# Patient Record
Sex: FEMALE | Race: WHITE | NOT HISPANIC OR LATINO | Employment: FULL TIME | URBAN - METROPOLITAN AREA
[De-identification: names, ages, dates, MRNs, and addresses within clinical notes are randomized per-mention and may not be internally consistent; named-entity substitution may affect disease eponyms.]

---

## 2018-05-24 ENCOUNTER — OFFICE VISIT (OUTPATIENT)
Dept: INTERNAL MEDICINE CLINIC | Facility: CLINIC | Age: 38
End: 2018-05-24
Payer: COMMERCIAL

## 2018-05-24 VITALS
HEIGHT: 66 IN | DIASTOLIC BLOOD PRESSURE: 90 MMHG | HEART RATE: 108 BPM | WEIGHT: 293 LBS | OXYGEN SATURATION: 100 % | BODY MASS INDEX: 47.09 KG/M2 | SYSTOLIC BLOOD PRESSURE: 132 MMHG

## 2018-05-24 DIAGNOSIS — F17.210 CIGARETTE NICOTINE DEPENDENCE WITHOUT COMPLICATION: ICD-10-CM

## 2018-05-24 DIAGNOSIS — F41.1 GENERALIZED ANXIETY DISORDER: Primary | ICD-10-CM

## 2018-05-24 DIAGNOSIS — R59.1 LYMPHADENOPATHY: ICD-10-CM

## 2018-05-24 DIAGNOSIS — E66.01 MORBID OBESITY (HCC): ICD-10-CM

## 2018-05-24 DIAGNOSIS — Z13.6 SCREENING FOR CARDIOVASCULAR CONDITION: ICD-10-CM

## 2018-05-24 DIAGNOSIS — R00.0 TACHYCARDIA: ICD-10-CM

## 2018-05-24 DIAGNOSIS — F33.41 RECURRENT MAJOR DEPRESSIVE DISORDER, IN PARTIAL REMISSION (HCC): ICD-10-CM

## 2018-05-24 PROCEDURE — 99204 OFFICE O/P NEW MOD 45 MIN: CPT | Performed by: INTERNAL MEDICINE

## 2018-05-24 PROCEDURE — 4004F PT TOBACCO SCREEN RCVD TLK: CPT | Performed by: INTERNAL MEDICINE

## 2018-05-24 PROCEDURE — 3008F BODY MASS INDEX DOCD: CPT | Performed by: INTERNAL MEDICINE

## 2018-05-24 RX ORDER — ESCITALOPRAM OXALATE 10 MG/1
10 TABLET ORAL DAILY
Qty: 90 TABLET | Refills: 1 | Status: SHIPPED | OUTPATIENT
Start: 2018-05-24

## 2018-05-24 NOTE — PROGRESS NOTES
INTERNAL MEDICINE OFFICE VISIT  [facility]  [address], [place], [address] [place] [address]  Tel: [phone]      NAME: Tamiko Herzog  AGE: 43 y o  SEX: female  : 1980   MRN: [id number]    DATE: 2018  TIME: 4:43 PM      Assessment and Plan:  1  Generalized anxiety disorder  Patient has been having a lot of anxiety all her life but did not want to take medication  She has been going for therapy off and on which does help her but she is not consistent with it   - CBC and differential; Future  - Comprehensive metabolic panel; Future  - TSH, 3rd generation; Future  - escitalopram (LEXAPRO) 10 mg tablet; Take 1 tablet (10 mg total) by mouth daily  Dispense: 90 tablet; Refill: 1    2  Recurrent major depressive disorder, in partial remission ([facility] )  She has no motivation and so was fatigue  I will start her on Lexapro 10 milligrams which will also help her anxiety along with the depression  I will see her back in 2 months for compliance  3  Morbid obesity ([facility] )  Patient does not want to see weight management as she is not interested in surgery  She was told to cut down on the calories, carbohydrates and fat intake and to increase her activity as much as possible in order to lose weight  4  Cigarette nicotine dependence without complication  She is a current smoker and says that she has cut down a lot  She was encouraged to continue to do so  5  Tachycardia  Her heart rate is high and she says when she is anxious her heart rate goes up    6  Lymphadenopathy  She has tender lymph node in front of the left ear  She has a couple of pimples that she has picked and they are open wounds on the face on the left side  I told her to let me know if the size of the lymph node increases ordered gets more painful  7  Screening for cardiovascular condition    - Lipid panel;  Future      - Counseling Documentation: patient was counseled regarding: instructions for management, risk factor reductions, prognosis, patient and family education, impressions, risks and benefits of treatment options and importance of compliance with treatment  - Medication Side Effects: Adverse side effects of medications were reviewed with the patient/guardian today  Return for follow up visit in 2 months or earlier, if needed  Chief Complaint:  Chief Complaint   Patient presents with    [facility]         History of Present Illness: This is a new patient was here to establish  Anxiety and depression-patient has been suffering from anxiety which is severe in degree and also from a lot of depression for a very long time  She had never taken any medication for it  She has gone for therapy off and on  Morbid obesity-she has been heavy all her life and has not been able to lose weight  Current smoker-she has been smoking for about 20 years off and on  She claims she has decreased the number of cigarettes  Tachycardia-will follow up at the next visit  Lymphadenopathy-she has a tender lymph node in front of the left ear which coincides with pimples on the same side of the face  Active Problem List:  Patient Active Problem List   Diagnosis    Morbid obesity ([facility] )    Tachycardia    Recurrent major depressive disorder, in partial remission [facility]    Generalized anxiety disorder    Cigarette nicotine dependence without complication         Past Medical History:  History reviewed  No pertinent past medical history  Past Surgical History:  History reviewed  No pertinent surgical history        Family History:  Family History   Problem Relation Age of Onset    Diabetes Father     Hyperlipidemia Father          Social History:  Social History     Social History    Marital status:      Spouse name: N/A    Number of children: N/A    Years of education: N/A     Social History Main Topics    Smoking status: Current Every Day Smoker     Packs/day: 0 25     Years: 20 00    Smokeless tobacco: Never Used    Alcohol use Yes      Comment: Occasional    Drug use: No    Sexual activity: Yes     Other Topics Concern    None     Social History Narrative    None         Allergies:   Allergies   Allergen Reactions    Penicillins Rash         Medications:    Current Outpatient Prescriptions:     levonorgestrel (MIRENA) 20 MCG/24HR IUD, 1 each by Intrauterine route, Disp: , Rfl:     escitalopram (LEXAPRO) 10 mg tablet, Take 1 tablet (10 mg total) by mouth daily, Disp: 90 tablet, Rfl: 1      The following portions of the patient's history were reviewed and updated as appropriate: past medical history, past surgical history, family history, social history, allergies, current medications and active problem list       Review of Systems:  Constitutional: Denies fever, chills, weight gain, weight loss, fatigue  Eyes: Denies eye redness, eye discharge, double vision, change in visual acuity  ENT: Denies hearing loss, tinnitus, sneezing, nasal congestion, nasal discharge, sore throat   Respiratory: Denies cough, expectoration, hemoptysis, shortness of breath, wheezing  Cardiovascular: Denies chest pain, palpitations, lower extremity swelling, orthopnea, PND  Gastrointestinal: Denies abdominal pain, heartburn, nausea, vomiting, hematemesis, diarrhea, bloody stools  Genito-Urinary: Denies dysuria, frequency, difficulty in micturition, nocturia, incontinence  Musculoskeletal: Denies back pain, joint pain, muscle pain  Neurologic: Denies confusion, lightheadedness, syncope, headache, focal weakness, sensory changes, seizures  Endocrine: Denies polyuria, polydipsia, temperature intolerance  Allergy and Immunology: Denies hives, insect bite sensitivity  Hematological and Lymphatic: Denies bleeding problems, swollen glands   Psychological: has depression,, anxiety, panic, mood swings  Dermatological: Denies pruritus, rash, skin lesion changes      Vitals:  Vitals:    05/24/18 1615   BP: 132/90 Pulse: (!) 108   SpO2: 100%       Body mass index is 58 69 kg/m²  Weight (last 2 days)     Date/Time   Weight    05/24/18 1615  (!)  165 (363 6)                Physical Examination:  General: Patient is not in acute distress  Awake, alert, responding to commands  No weight gain or loss  Head: Normocephalic  Atraumatic  Eyes: Conjunctiva and lids with no swelling, erythema or discharge  Both pupils normal sized, round and reactive to light  Sclera nonicteric  ENT: External examination of nose and ear normal  Otoscopic examination shows translucent tympanic membranes with patent canals without erythema  Oropharynx moist with no erythema, edema, exudate or lesions  Neck: Supple  JVP not raised  Trachea midline  No masses  No thyromegaly  Lungs: No signs of increased work of breathing or respiratory distress  Bilateral bronchovascular breath sounds with no crackles or rhonchi  Chest wall: No tenderness  Cardiovascular: Normal PMI  No thrills  Regular and tachycardic S1 and S2 normal  No murmur, rub or gallop  Gastrointestinal: Abdomen soft, nontender  No guarding or rigidity  Liver and spleen not palpable  Bowel sounds present  Neurologic: Cranial nerves II-XII intact   Cortical functions normal  Motor system - Reflexes 2+ and symmetrical  Sensations normal  Musculoskeletal: Gait normal  No joint tenderness  Integumentary: Skin normal with no rash or lesions  Lymphatic: No palpable lymph nodes in neck, axilla or groin  Extremities: No clubbing, cyanosis, edema or varicosities  Psychological: Judgement and insight normal   Very anxious and depressed      Laboratory Results:  CBC with diff:   No results found for: WBC, RBC, HGB, HCT, MCV, MCH, RDW, PLT    CMP:  No results found for: CREATININE, BUN, NA, K, CL, CO2, GLUCOSE, PROT, ALKPHOS, ALT, AST, BILIDIR    No results found for: HGBA1C, MG, PHOS    No results found for: TROPONINI, CKMB, CKTOTAL    Lipid Profile:   No results found for: CHOL  No results found for: HDL  No results found for: LDLCALC  No results found for: TRIG      Health Maintenance:  Health Maintenance   Topic Date Due    HIV SCREENING  1980    PNEUMOCOCCAL POLYSACCHARIDE VACCINE AGE 2-64 HIGH RISK  04/03/1981    DTaP,Tdap,and Td Vaccines (1 - Tdap) 04/03/2000    INFLUENZA VACCINE  09/01/2018    Depression Screening PHQ-9  05/24/2019       There is no immunization history on file for this patient        Claudine Langston MD  5/19/8721,4:44 PM

## 2020-01-16 ENCOUNTER — TELEPHONE (OUTPATIENT)
Dept: INTERNAL MEDICINE CLINIC | Facility: CLINIC | Age: 40
End: 2020-01-16

## 2022-07-13 ENCOUNTER — TELEPHONE (OUTPATIENT)
Dept: BARIATRICS | Facility: CLINIC | Age: 42
End: 2022-07-13

## 2022-07-13 NOTE — TELEPHONE ENCOUNTER
Reached out to Pt re: evaluation appt  Pt stated that she had broken a tooth yesterday and this morning her face is swollen/eye is swollen shut  Pt requested to reschedule her appt  Rescheduled for 7/27 @ 8:00am  Pt was in agreement with this plan

## 2022-07-27 ENCOUNTER — OFFICE VISIT (OUTPATIENT)
Dept: BARIATRICS | Facility: CLINIC | Age: 42
End: 2022-07-27

## 2022-07-27 VITALS
HEART RATE: 97 BPM | DIASTOLIC BLOOD PRESSURE: 121 MMHG | WEIGHT: 293 LBS | TEMPERATURE: 98.7 F | RESPIRATION RATE: 18 BRPM | HEIGHT: 67 IN | SYSTOLIC BLOOD PRESSURE: 172 MMHG | BODY MASS INDEX: 45.99 KG/M2

## 2022-07-27 DIAGNOSIS — F32.A DEPRESSION: Primary | ICD-10-CM

## 2022-07-27 DIAGNOSIS — E66.9 OBESITY, UNSPECIFIED: ICD-10-CM

## 2022-07-27 PROCEDURE — RECHECK
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
## 2022-07-27 NOTE — PROGRESS NOTES
Bariatric Nutrition Assessment Note    Type of surgery    Preop  Surgery Date: TBD tentative 3/2023  Surgeon: Dr Jordyn Ch  36 y o   female     Wt with BMI of 25: 158 lbs  Pre-Op Excess Wt: 233 lbs  Blood pressure (!) 172/121, pulse 97, temperature 98 7 °F (37 1 °C), temperature source Tympanic, resp  rate 18, height 5' 6 5" (1 689 m), weight (!) 177 kg (391 lb)  Body mass index is 62 16 kg/m²     [place] Eisenmenger Equation-2230 kcals to lose 2 lb/week                                     Estimated protein needs 65-78g protein                                     Estimated fluid needs 80 oz fluid    Weight History   Onset of Obesity: Childhood  Family history of obesity: Yes, both sides of family -mom had WLS almost 4 years ago  Wt Loss Attempts: Commercial Programs ([employer], [employer], etc )  High Protein/Low CHO diets (Atkins, Union, etc )  Self Created Diets (Portion Control, Healthy Food Choices, etc )  Maximum Wt Lost: 40 lbs with low carb diet in <4 months    Review of History and Medications   Past Medical History:   Diagnosis Date    Anxiety     Obesity     Palpitations      Past Surgical History:   Procedure Laterality Date    COLONOSCOPY  2010    ELBOW SURGERY      elbow reset as a child    MULTIPLE TOOTH EXTRACTIONS       Social History     Socioeconomic History    Marital status:      Spouse name: None    Number of children: None    Years of education: None    Highest education level: None   Occupational History    None   Tobacco Use    Smoking status: Current Some Day Smoker     Packs/day: 0 25     Years: 20 00     Pack years: 5 00     Types: Cigarettes    Smokeless tobacco: Never Used   Substance and Sexual Activity    Alcohol use: Yes     Comment: Occasional    Drug use: No    Sexual activity: Yes     Birth control/protection: Condom   Other Topics Concern    None   Social History Narrative    None     Social Determinants of Health     Financial Resource Strain: Not on file   Food Insecurity: Not on file   Transportation Needs: Not on file   Physical Activity: Not on file   Stress: Not on file   Social Connections: Not on file   Intimate Partner Violence: Not on file   Housing Stability: Not on file       Current Outpatient Medications:     escitalopram (LEXAPRO) 10 mg tablet, Take 1 tablet (10 mg total) by mouth daily, Disp: 90 tablet, Rfl: 1    levonorgestrel (MIRENA) 20 MCG/24HR IUD, 1 each by Intrauterine route, Disp: , Rfl:   Food Intake and Lifestyle Assessment   Food Intake Assessment completed via usual diet recall  Breakfast: Just started- whipped yogurt  Snack: 0   Lunch: skips or sandwich, salad or leftovers or soft pretzel  Snack: occ Twizzlers  Dinner: soup and sandwich, or Sushi  Almost always takeout  Snack: 0  Beverage intake: water, coffee/tea and sweetened and unsweetened iced tea  Protein supplement: 0  Estimated protein intake per day: 65g  Estimated fluid intake per day: 32oz  Meals eaten away from home: takeout almost daily  Typical meal pattern: 2-3 meals per day and 0-1 snacks per day  Eating Behaviors: Consumption of high calorie/ high fat foods, Large portion sizes, Frequent snacking/ grazing and gets takeout daily for dinner  Food allergies or intolerances: Allergies   Allergen Reactions    [place] Meal - Food Allergy Itching     Throat itch    Banana - Food Allergy Itching     Throat itch    Penicillins Rash     Other reaction(s): Other (Please comment)  Other reaction(s): yeast inf  Yeast infections   Can take amoxicillin       Cultural or Baptism considerations: N/A    Physical Assessment  Physical Activity  Types of exercise: walks dogs but not enough  Current physical limitations: none    Psychosocial Assessment   Support systems: significant other friend(s)  Socioeconomic factors: Lives with fiNovasentis    Nutrition Diagnosis  Diagnosis: Overweight / Obesity (NC-3 3)  Related to: Physical inactivity and Excessive energy intake  As Evidenced by: BMI >60     Nutrition Prescription: Recommend the following diet  Low fat, Low sugar and High protein    Interventions and Teaching   Discussed pre-op and post-op nutrition guidelines  Patient educated and handouts provided  Surgical changes to stomach / GI  Capacity of post-surgery stomach  Diet progression  Adequate hydration  Sugar and fat restriction to decrease "dumping syndrome"  Fat restriction to decrease steatorrhea  Expected weight loss  Weight loss plateaus/ possibility of weight regain  Exercise  Suggestions for pre-op diet  Nutrition considerations after surgery  Protein supplements  Meal planning and preparation  Appropriate carbohydrate, protein, and fat intake, and food/fluid choices to maximize safe weight loss, nutrient intake, and tolerance   Dietary and lifestyle changes  Possible problems with poor eating habits  Intuitive eating  Techniques for self monitoring and keeping daily food journal  Potential for food intolerance after surgery, and ways to deal with them including: lactose intolerance, nausea, reflux, vomiting, diarrhea, food intolerance, appetite changes, gas  Vitamin / Mineral supplementation of Multivitamin with minerals and Vitamin D  Post-operative pregnancy guidelines    Education provided to: patient    Barriers to learning: No barriers identified  Readiness to change: preparation    Prior research on procedure: discussed with provider    Comprehension: verbalizes understanding     Expected Compliance: good  Recommendations  Pt is an appropriate candidate for surgery   Yes  Evaluation / Monitoring  Dietitian to Monitor: Eating pattern as discussed Body weight Lab values Physical activity    Goals  Eliminate sugar sweetened beverages, Food journal, Exercise 30 minutes 5 times per week, Complete lession plans 1-6, Eat 3 meals per day and Eliminate mindless snacking    Time Spent:   1 Hour

## 2022-08-11 ENCOUNTER — OFFICE VISIT (OUTPATIENT)
Dept: BARIATRICS | Facility: CLINIC | Age: 42
End: 2022-08-11

## 2022-08-11 VITALS — WEIGHT: 293 LBS | BODY MASS INDEX: 61.5 KG/M2

## 2022-08-11 DIAGNOSIS — E66.01 OBESITY, CLASS III, BMI 40-49.9 (MORBID OBESITY) (HCC): Primary | ICD-10-CM

## 2022-08-11 PROCEDURE — RECHECK: Performed by: SOCIAL WORKER

## 2022-08-11 NOTE — PROGRESS NOTES
June Olsen  is a 36 y o    female    :  1980    Patient presented for Pre-surgical weight check  WT [address] number 1 of 3  Starting weight: 286 8   Submission Target:   364 Today's weight:  386 8        Discussed preparations for surgery and lifestyle change  She is focused on drinking water and has cut way back on tea and coffee  On vacation to [place] next week  She was curious about how to drink on vacation  Eating Behaviors - she is more conscious of what she is choosing to eat  But she has not been hungry  She is becoming aware of grazing, especially on cubes of cheese  She is challenged to eat regular meals  She has cut down on eating out  She does not cook for her Nilda Espinoza, who had WLS but still has some stomach issues and is a picky eater  Coping and mental health - she is doing mental practice for preparing and planning meals, scheduling exercise  She is setting better boundaries around work  Activity - she loved running and is interested in lifting at the gym, she used to be athletic  Plan:  She wants to actually exercise and wants to get better at meal prepping     Next appointment is scheduled for  with SARINA Ma, JUAN  _____________________________

## 2022-08-19 ENCOUNTER — TELEPHONE (OUTPATIENT)
Dept: PSYCHIATRY | Facility: CLINIC | Age: 42
End: 2022-08-19

## 2022-09-22 ENCOUNTER — TELEPHONE (OUTPATIENT)
Dept: OTHER | Facility: OTHER | Age: 42
End: 2022-09-22

## 2022-09-22 ENCOUNTER — TELEPHONE (OUTPATIENT)
Dept: PSYCHIATRY | Facility: CLINIC | Age: 42
End: 2022-09-22

## 2022-09-22 ENCOUNTER — OFFICE VISIT (OUTPATIENT)
Dept: BARIATRICS | Facility: CLINIC | Age: 42
End: 2022-09-22

## 2022-09-22 VITALS — WEIGHT: 293 LBS | BODY MASS INDEX: 61.37 KG/M2

## 2022-09-22 DIAGNOSIS — E66.01 OBESITY, CLASS III, BMI 40-49.9 (MORBID OBESITY) (HCC): Primary | ICD-10-CM

## 2022-09-22 PROCEDURE — RECHECK: Performed by: SOCIAL WORKER

## 2022-09-22 NOTE — TELEPHONE ENCOUNTER
Office received a message from patient regarding referral from weight management and wanted to schedule an appointment   Writer returned call and advised pt to contact intake dept and telephone # given
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
## 2022-09-22 NOTE — TELEPHONE ENCOUNTER
Patient called to schedule a psychiatric appointment  Referral was entered  Please reach pout to patient to set up appointment

## 2022-09-28 NOTE — TELEPHONE ENCOUNTER
Spoke to Patient in regards to referral  Patient is looking for one time consult for weight mgmt  Inform patient of the wait list, suggested calling around as well  Patient stated she would interested going forward with a dr after surgery

## 2022-10-11 ENCOUNTER — TELEPHONE (OUTPATIENT)
Dept: BARIATRICS | Facility: CLINIC | Age: 42
End: 2022-10-11

## 2022-10-11 NOTE — TELEPHONE ENCOUNTER
LM for patient to reschedule missed appointment  Office information left for patient to call back at convenience

## 2025-04-21 ENCOUNTER — OFFICE VISIT (OUTPATIENT)
Dept: URGENT CARE | Age: 45
End: 2025-04-21
Payer: COMMERCIAL

## 2025-04-21 VITALS
HEART RATE: 105 BPM | RESPIRATION RATE: 16 BRPM | DIASTOLIC BLOOD PRESSURE: 78 MMHG | TEMPERATURE: 99.6 F | SYSTOLIC BLOOD PRESSURE: 138 MMHG | OXYGEN SATURATION: 98 %

## 2025-04-21 DIAGNOSIS — N39.41 URGE INCONTINENCE: ICD-10-CM

## 2025-04-21 DIAGNOSIS — R31.9 URINARY TRACT INFECTION WITH HEMATURIA, SITE UNSPECIFIED: Primary | ICD-10-CM

## 2025-04-21 DIAGNOSIS — N39.0 URINARY TRACT INFECTION WITH HEMATURIA, SITE UNSPECIFIED: Primary | ICD-10-CM

## 2025-04-21 LAB
SL AMB  POCT GLUCOSE, UA: ABNORMAL
SL AMB LEUKOCYTE ESTERASE,UA: ABNORMAL
SL AMB POCT BILIRUBIN,UA: ABNORMAL
SL AMB POCT BLOOD,UA: ABNORMAL
SL AMB POCT CLARITY,UA: ABNORMAL
SL AMB POCT COLOR,UA: ABNORMAL
SL AMB POCT KETONES,UA: ABNORMAL
SL AMB POCT NITRITE,UA: ABNORMAL
SL AMB POCT PH,UA: 5
SL AMB POCT SPECIFIC GRAVITY,UA: 1.03
SL AMB POCT URINE HCG: NORMAL
SL AMB POCT URINE PROTEIN: ABNORMAL
SL AMB POCT UROBILINOGEN: 0.2

## 2025-04-21 PROCEDURE — 81025 URINE PREGNANCY TEST: CPT | Performed by: PHYSICIAN ASSISTANT

## 2025-04-21 PROCEDURE — G0383 LEV 4 HOSP TYPE B ED VISIT: HCPCS | Performed by: PHYSICIAN ASSISTANT

## 2025-04-21 PROCEDURE — 81002 URINALYSIS NONAUTO W/O SCOPE: CPT | Performed by: PHYSICIAN ASSISTANT

## 2025-04-21 PROCEDURE — 87086 URINE CULTURE/COLONY COUNT: CPT | Performed by: PHYSICIAN ASSISTANT

## 2025-04-21 PROCEDURE — S9083 URGENT CARE CENTER GLOBAL: HCPCS | Performed by: PHYSICIAN ASSISTANT

## 2025-04-21 RX ORDER — BUPROPION HYDROCHLORIDE 150 MG/1
150 TABLET ORAL EVERY MORNING
COMMUNITY

## 2025-04-21 RX ORDER — NITROFURANTOIN 25; 75 MG/1; MG/1
100 CAPSULE ORAL 2 TIMES DAILY
Qty: 10 CAPSULE | Refills: 0 | Status: SHIPPED | OUTPATIENT
Start: 2025-04-21 | End: 2025-04-26

## 2025-04-21 RX ORDER — AMLODIPINE BESYLATE 2.5 MG/1
2.5 TABLET ORAL DAILY
COMMUNITY

## 2025-04-21 RX ORDER — FLUOXETINE HYDROCHLORIDE 40 MG/1
40 CAPSULE ORAL DAILY
COMMUNITY

## 2025-04-21 NOTE — PATIENT INSTRUCTIONS
Take antibiotics as prescribed. Complete the entire course. If you do not complete the entire course this may result in bacterial resistance making future infections very difficult or impossible to treat. You should never have leftover antibiotics unless your antibiotic is switched.   We send all positive urine for culture, we will call you if your antibiotic needs to be changed based on culture results.   If symptoms do not improve in 2-3 days, follow-up with your primary care provider.   If you develop fever, chills, or worsening low back pain report to the emergency room. These are symptoms of a more serious condition or possible spread of the infection into your bloodstream.

## 2025-04-22 LAB — BACTERIA UR CULT: NORMAL

## 2025-04-22 NOTE — PROGRESS NOTES
[facility]        NAME: Priya Ferrari is a 45 y.o. female  : 1980    MRN: [id number]  DATE: 2025  TIME: 8:14 PM    Assessment and Plan   Urinary tract infection with hematuria, site unspecified [N39.0, R31.9]  1. Urinary tract infection with hematuria, site unspecified  POCT urine dip    POCT urine HCG    Urine culture    Urine culture    nitrofurantoin (MACROBID) 100 mg capsule    Ambulatory Referral to Physical Therapy      2. Urge incontinence  Ambulatory Referral to Physical Therapy      Pt presents with symptoms consistent with possible UTI. UA results are consistent with urinary tract infection.  Pt will be started on macrobid empirically to treat. Will send urine for culture and notify her if there is any resistance. Offered referral for pelvic floor PT for urge incontinence issues, pt reports she used to hold her urine for long periods until she really had to go and now sometimes just has incontinence without urge to go. She also reports poor experiences with urgogyn and gyn in the past with offhand comments about the tone of her pelvic floor, but states she is willing to try something as her symptoms have been worsening over time.  Pt will follow-up with her PCP in 2-3 days if  UTI symptoms are not improved. We discussed symptoms of pyelonephritis and urosepsis and when to report to the emergency department.      Medical Decision Making     PROBLEM: 1 acute, uncomplicated illness or injury    DATA: Test(s) Ordered: yes, urine dip, HCG, urine culture.     RISK: Physical and/or Occupational Therapy  Prescription drug management    TIME: 20 minutes      Patient Instructions     Patient Instructions   Take antibiotics as prescribed. Complete the entire course. If you do not complete the entire course this may result in bacterial resistance making future infections very difficult or impossible to treat. You should never have leftover antibiotics unless your antibiotic is switched.   We  send all positive urine for culture, we will call you if your antibiotic needs to be changed based on culture results.   If symptoms do not improve in 2-3 days, follow-up with your primary care provider.   If you develop fever, chills, or worsening low back pain report to the emergency room. These are symptoms of a more serious condition or possible spread of the infection into your bloodstream.      Follow up with PCP in 3-5 days.  Proceed to  ER if symptoms worsen.    If tests have been performed at [facility], our office will contact you with results if changes need to be made to the care plan discussed with you at the visit. You can review your full results on [facility] MyChart.     Chief Complaint     Chief Complaint   Patient presents with    Possible UTI     Pt reports hx of urgency and incontinence for the past two years. Some discomfort but not burning when urinating. Has been seen by gyno urology.          History of Present Illness       45 year old female presents with complaint of worsening urgency and discomfort when urinating. She has had multiple UTI's in the past and has had similar symptoms.         Review of Systems   Review of Systems   Constitutional:  Negative for chills and fever.   Gastrointestinal:  Negative for abdominal distention, abdominal pain, diarrhea, nausea and vomiting.   Genitourinary:  Positive for dysuria, frequency and urgency. Negative for flank pain.   Musculoskeletal:  Negative for back pain.         Current Medications       Current Outpatient Medications:     amLODIPine (NORVASC) 2.5 mg tablet, Take 2.5 mg by mouth daily, Disp: , Rfl:     buPROPion (WELLBUTRIN XL) 150 mg 24 hr tablet, Take 150 mg by mouth every morning, Disp: , Rfl:     FLUoxetine (PROzac) 40 MG capsule, Take 40 mg by mouth daily, Disp: , Rfl:     metFORMIN (GLUCOPHAGE) 500 mg tablet, Take 500 mg by mouth 2 (two) times a day with meals, Disp: , Rfl:     nitrofurantoin (MACROBID) 100 mg capsule, Take 1  capsule (100 mg total) by mouth 2 (two) times a day for 5 days, Disp: 10 capsule, Rfl: 0    escitalopram (LEXAPRO) 10 mg tablet, Take 1 tablet (10 mg total) by mouth daily, Disp: 90 tablet, Rfl: 1    levonorgestrel (MIRENA) 20 MCG/24HR IUD, 1 each by Intrauterine route, Disp: , Rfl:     Current Allergies     Allergies as of 04/21/2025 - Reviewed 04/21/2025   Allergen Reaction Noted    [place] meal - food allergy Itching 07/27/2022    Banana - food allergy Itching 07/27/2022    Penicillins Rash 05/11/2012            The following portions of the patient's history were reviewed and updated as appropriate: allergies, current medications, past family history, past medical history, past social history, past surgical history and problem list.     Past Medical History:   Diagnosis Date    Anxiety     Obesity     Palpitations        Past Surgical History:   Procedure Laterality Date    COLONOSCOPY  2010    ELBOW SURGERY      elbow reset as a child    MULTIPLE TOOTH EXTRACTIONS         Family History   Problem Relation Age of Onset    Hashimoto's thyroiditis Mother     Arthritis Mother     Diabetes Father     Hyperlipidemia Father     COPD Father          Medications have been verified.        Objective   /78   Pulse 105   Temp 99.6 °F (37.6 °C)   Resp 16   LMP 04/10/2025 (Exact Date)   SpO2 98%   Patient's last menstrual period was 04/10/2025 (exact date).       Physical Exam     Physical Exam  Vitals and nursing note reviewed.   Constitutional:       General: She is awake. She is not in acute distress.     Appearance: Normal appearance. She is well-developed and well-groomed. She is not ill-appearing, toxic-appearing or diaphoretic.   HENT:      Head: Normocephalic and atraumatic.      Right Ear: Hearing and external ear normal.      Left Ear: Hearing and external ear normal.   Eyes:      General: Lids are normal. Vision grossly intact. Gaze aligned appropriately.   Cardiovascular:      Rate and Rhythm: Normal  "rate.   Pulmonary:      Effort: Pulmonary effort is normal.   Abdominal:      General: Abdomen is flat.      Palpations: Abdomen is soft.      Tenderness: There is no abdominal tenderness. There is no right CVA tenderness or left CVA tenderness.   Musculoskeletal:      Cervical back: Normal range of motion.   Skin:     General: Skin is warm and dry.   Neurological:      Mental Status: She is alert and oriented to person, place, and time.      Coordination: Coordination is intact.      Gait: Gait is intact.   Psychiatric:         Attention and Perception: Attention and perception normal.         Mood and Affect: Mood and affect normal.         Speech: Speech normal.         Behavior: Behavior normal. Behavior is cooperative.               Note: Portions of this record may have been created with voice recognition software. Occasional wrong word or \"sound a like\" substitutions may have occurred due to the inherent limitations of voice recognition software. Please read the chart carefully and recognize, using context, where substitutions have occurred.*      "